# Patient Record
Sex: MALE | Race: OTHER | HISPANIC OR LATINO | URBAN - METROPOLITAN AREA
[De-identification: names, ages, dates, MRNs, and addresses within clinical notes are randomized per-mention and may not be internally consistent; named-entity substitution may affect disease eponyms.]

---
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
## 2023-02-23 NOTE — ASU PREOP CHECKLIST - PATIENT SENT TO
February 23, 2023       Fox Fajardo DO  [address] [address]  Via Fax: [phone]      Patient: Chiquis Quigley   YOB: 1930   Date of Visit: 2/23/2023       Dear Dr. Fajardo:    Thank you for referring Chiquis Quigley to me for evaluation. Below are my notes for this visit with her.    If you have questions, please do not hesitate to call me. I look forward to following your patient along with you.      Sincerely,        Mason Clay MD        CC: No Recipients  Mason Clay MD  2/23/2023 11:06 AM  Signed  CARDIAC ELECTROPHYSIOLOGY CONSULT NOTE          REQUESTING PROVIDER:  Mason Clay MD    PCP: Fox Fajardo DO     CHIEF COMPLAINT:    Chief Complaint   Patient presents with   • Consultation     From Dr Eric we are following [facility]        HISTORY OF PRESENT ILLNESS   Chiquis Quigley is a 89 year old female seen today in consult for [employer] management. History, medications, and allergies have been reviewed with the patient.    She was evaluated for shortness of breath earlier this year with a pulmonary function test which suggested a diffusion defect.  She was referred to pulmonology who sent her for CT chest which was negative for interstitial lung disease.  Her shortness of breath is attributed to age-related deconditioning.  Mild AS- sees cardiology. She denies chest pain, palpitations, dizziness, or syncope      MEDICATIONS   Outpatient medications:  Current Outpatient Medications   Medication Sig   • pantoprazole (Protonix) 40 MG tablet Take 1 tablet by mouth daily.   • amLODIPine (NORVASC) 5 MG tablet Take 5 mg by mouth daily.   • escitalopram (LEXAPRO) 20 MG tablet Take 20 mg by mouth daily.   • isosorbide mononitrate (IMDUR) 30 MG 24 hr tablet Take 30 mg by mouth daily.   • morphine SR (MS CONTIN) 15 MG 12 hr tablet Take 15 mg by mouth every 12 hours.   • omeprazole (PriLOSEC) 40 MG capsule Take 40 mg by mouth daily.   • docusate sodium  (COLACE) 100 MG capsule Take 100 mg by mouth in the morning and 100 mg before bedtime.   • aspirin (ECOTRIN) 81 MG EC tablet Take 81 mg by mouth daily.     No current facility-administered medications for this visit.      HISTORIES     ALLERGIES:   Allergen Reactions   • Ciprofloxacin Hallucinations   • Keflex Other (See Comments)     Reaction not specified- has been taking w/o rxn   • Statins GI UPSET     Reaction not specified   • Sulfamethoxazole-Trimethoprim Hallucinations     Reaction not specified     Past Medical History:   Diagnosis Date   • Aortic stenosis 02/11/2021    mild   • Arthritis    • Cancer (CMS/HCC)    • Cervical radiculopathy    • Chest pain    • Chronic pain syndrome    • Chronic right shoulder pain    • Chronic UTI    • DDD (degenerative disc disease), cervical    • Heart murmur    • Hypertension    • Long term prescription opiate use    • Low back pain    • Mixed hyperlipidemia    • Other forms of acute ischemic heart disease (CMS/HCC)    • Radicular pain of lumbosacral region    • Sacroiliitis, not elsewhere classified (CMS/HCC)    • Sick sinus syndrome (CMS/HCC)    • Spondylosis    • Tendonitis    • Unstable angina (CMS/HCC)      Past Surgical History:   Procedure Laterality Date   • Anes tonsillectomy & adenoidectomy; age  1936   • Appendectomy  1941   • Coronary stent placement  12/2016    4 stents placed, 3.0 x 38 mm mid LAD, 3 x 23 mm prox RCA, 3.0 x 28 mm mid RCA, 2.5 x 33 mm dist RCA   • Gallbladder surgery  1973   • Hysterectomy  1980   • Lung surgery  1963   • Mastectomy  1999   • Pacemaker  08/09/2018    Biotronik Tea ACRNES, Solia S 45 and S 53 leads     Family History   Problem Relation Age of Onset   • Cancer, Colon Mother    • Hyperlipidemia Mother    • Myocardial Infarction Father    • Hyperlipidemia Father    • Sudden Death Father         sudden cardiac death   • Stroke Sister    • Cancer, Lung Sister    • Heart disease Sister    • Diabetes Brother      Social History      Tobacco Use   • Smoking status: Never   • Smokeless tobacco: Never   Vaping Use   • Vaping Use: never used   Substance Use Topics   • Alcohol use: Yes     Comment: rare   • Drug use: Never       REVIEW OF SYSTEMS   Constitutional: Negative for fatigue, change in activity level or change in weight.   Skin: Negative for edema, pallor, rashes or open wounds.   HEENT: Negative for visual changes, epistaxis or headaches.  Respiratory: BARCLAY    Cardiovascular: Negative for exertional chest discomfort, chest pressure, palpitations or diaphoresis. Negative for lightheadedness or dizziness. Negative for near syncope or syncope.  Negative for intermittent leg claudication.   Gastrointestinal: Negative for abdominal pain.   Genitourinary: Negative for hematuria.  Extremities: DJD  Neuro:  Negative for changes in speech, sensory deficits or change in motor functions.  Endocrine: Negative for heat intolerance, excessive thirst or urination  .  Hematological: Negative for bleeding or brusing.  Psych: Negative for change in affect, change in mentation or sleep disturbance.      PHYSICAL EXAM   Vital Signs:    Visit Vitals  /76   Pulse 71     General:   Alert, cooperative, conversive in no acute distress.  Skin:  Warm and dry without rash.    Head:  Normocephalic-atraumatic.   Neck:  Trachea is midline. No adenopathy.  Normal thyroid without mass or tenderness.  Eyes:  Normal conjunctiva and sclera.  Cardiovascular: regular rate and rhythm, S1, S2 normal, no murmur, click, rub or gallop  Respiratory: clear to auscultation bilaterally  Gastrointestinal:  Soft and nontender.  Normal bowel sounds.  No hepatomegaly or splenomegaly.   Extremities:  extremities normal, atraumatic, no cyanosis or edema  Neuro:   Orientated x 4.  No focal deficits or lateralizing signs.  Psychiatric:   Cooperative.  Appropriate mood & affect.    LABORATORY DATA     Lab Results   Component Value Date    TSH 3.677 10/12/2021    TSH 2.892 08/11/2021     CHOLESTEROL 138 10/12/2021    HDL 50 10/12/2021    CALCLDL 63 10/12/2021    TRIGLYCERIDE 126 10/12/2021       DIAGNOSTIC IMAGING        ASSESSMENT & PLAN   Sick sinus- dependent in atrium  Dual Chamber Pacemaker -   Device event memory and lead parameters reviewed.   NL sensing, threshold and impedance.   AS- mild  coronary artery disease- medical therapy per Dr Hernesto Clay MD        operating room